# Patient Record
Sex: MALE | Employment: OTHER | URBAN - METROPOLITAN AREA
[De-identification: names, ages, dates, MRNs, and addresses within clinical notes are randomized per-mention and may not be internally consistent; named-entity substitution may affect disease eponyms.]

---

## 2025-02-06 ENCOUNTER — OFFICE VISIT (OUTPATIENT)
Dept: URGENT CARE | Age: 30
End: 2025-02-06

## 2025-02-06 VITALS
BODY MASS INDEX: 28.06 KG/M2 | RESPIRATION RATE: 19 BRPM | TEMPERATURE: 97.8 F | WEIGHT: 196 LBS | HEART RATE: 94 BPM | OXYGEN SATURATION: 97 % | DIASTOLIC BLOOD PRESSURE: 84 MMHG | HEIGHT: 70 IN | SYSTOLIC BLOOD PRESSURE: 125 MMHG

## 2025-02-06 DIAGNOSIS — S91.331A PUNCTURE WOUND OF RIGHT FOOT, INITIAL ENCOUNTER: Primary | ICD-10-CM

## 2025-02-06 NOTE — PROGRESS NOTES
"Subjective   Patient ID: Demetri Vegas is a 29 y.o. male. He presents today with a chief complaint of Foot Pain (Right foot pain/Patient states he stepped on a nail ) and Foot Swelling.  Patient presents with puncture wound, sole of right foot. While wearing his boots, he stepped on a nail yesterday.  He cannot recall when his last Tetanus vaccination was.    History of Present Illness  HPI    Past Medical History  Allergies as of 02/06/2025 - Reviewed 02/06/2025   Allergen Reaction Noted    Penicillin g Other 01/09/2004       (Not in a hospital admission)       No past medical history on file.    No past surgical history on file.     reports that he has never smoked. He has never used smokeless tobacco.    Review of Systems  Review of Systems                               Objective    Vitals:    02/06/25 1507   BP: 125/84   Pulse: 94   Resp: 19   Temp: 36.6 °C (97.8 °F)   TempSrc: Temporal   SpO2: 97%   Weight: 88.9 kg (196 lb)   Height: 1.778 m (5' 10\")     No LMP for male patient.    Physical Exam  Vitals and nursing note reviewed.   Constitutional:       General: He is not in acute distress.     Appearance: Normal appearance. He is not toxic-appearing.   Cardiovascular:      Pulses: Normal pulses.   Skin:     General: Skin is warm and dry.      Capillary Refill: Capillary refill takes less than 2 seconds.      Comments: Sole of right foot with small erythematous puncture wound, no surrounding erythema or drainage   Neurological:      Mental Status: He is alert.      Sensory: No sensory deficit.         Procedures    Point of Care Test & Imaging Results from this visit  No results found for this visit on 02/06/25.   No results found.    Diagnostic study results (if any) were reviewed by Morelia Dumont MD.    Assessment/Plan   Allergies, medications, history, and pertinent labs/EKGs/Imaging reviewed by Morelia Dumont MD.     Medical Decision Making      Orders and Diagnoses  There are no diagnoses linked to this " encounter.    Medical Admin Record      Patient disposition: Home    Electronically signed by Morelia Dumont MD  3:18 PM